# Patient Record
Sex: MALE
[De-identification: names, ages, dates, MRNs, and addresses within clinical notes are randomized per-mention and may not be internally consistent; named-entity substitution may affect disease eponyms.]

---

## 2020-12-11 NOTE — CT
PROCEDURE INFORMATION: 

Exam: CT Abdomen And Pelvis Without Contrast 

Exam date and time: 12/11/2020 2:32 AM 

Age: 50 years old 

Clinical indication: Abdominal pain; Additional info: Left lower quadrant 

abdominal pain 



TECHNIQUE: 

Imaging protocol: Computed tomography of the abdomen and pelvis without 

contrast. 

Radiation optimization: All CT scans at this facility use at least one of these 

dose optimization techniques: automated exposure control; mA and/or kV 

adjustment per patient size (includes targeted exams where dose is matched to 

clinical indication); or iterative reconstruction. 



COMPARISON: 

No relevant prior studies available. 



FINDINGS: 

Liver: Normal. No mass. 

Gallbladder and bile ducts: Normal. No calcified stones. No ductal dilation. 

Pancreas: Normal. No ductal dilation. 

Spleen: Normal. No splenomegaly. 

Adrenal glands: Normal. No mass. 

Kidneys and ureters: A 5.3 mm nonobstructing calculus seen in the lower pole of 

the right kidney. There are 2 nonobstructing calculi seen within the left 

kidney, the largest is seen in the upper pole measuring 3.8 mm. There is 

hydronephrosis and hydroureter seen on the left. There are strandy opacity seen 

adjacent to the proximal left ureter. There is a partially obstructing 3.1 mm 

proximal left ureteral calculus present. 

Stomach and bowel: Unremarkable. No obstruction. No mucosal thickening. 

Appendix: No evidence of appendicitis. 



Intraperitoneal space: Unremarkable. No free air. No significant fluid 

collection. 

Vasculature: Unremarkable. No abdominal aortic aneurysm. 

Lymph nodes: Unremarkable. No enlarged lymph nodes. 

Urinary bladder: Unremarkable as visualized. 

Reproductive: There are small bilateral hydroceles. 

Bones/joints: Unremarkable. No acute fracture. 

Soft tissues: Unremarkable. 



IMPRESSION: 

1. Partially obstructing 3.1 mm proximal left ureteral calculus 

2. There are bilateral nonobstructing renal calculi present, largest is seen in 

the upper pole of the left kidney measuring 3.8 mm. 

3. Small bilateral hydroceles

## 2020-12-11 NOTE — EDM.PDOC
ED HPI GENERAL MEDICAL PROBLEM





- General


Chief Complaint: Diabetic Complaint


Stated Complaint: diabetic


Time Seen by Provider: 12/11/20 01:05


Source of Information: Reports: Patient


History Limitations: Reports: No Limitations





- History of Present Illness


INITIAL COMMENTS - FREE TEXT/NARRATIVE: 





This 49 yo male patient reports to the ED with left lower quadrant abdominal 

pain. The patient reports his symptoms started at about 2300 last night. The 

patient reports his pain woke him up causing him to come to the ED. 


Onset Date: 12/10/20


Onset Time: 23:00


Duration: Constant


Location: Reports: Abdomen (LLQ)


Quality: Reports: Ache, Sharp


Severity: Moderate


Improves with: Reports: Movement


Worsens with: Reports: None


Context: Reports: Other


Associated Symptoms: Reports: No Other Symptoms


  ** Left Lower Abdomen


Pain Score (Numeric/FACES): 8





- Related Data


                                    Allergies











Allergy/AdvReac Type Severity Reaction Status Date / Time


 


No Known Allergies Allergy   Verified 07/31/19 12:28














Past Medical History


Endocrine/Metabolic History: Reports: Diabetes, Type II





Social & Family History





- Tobacco Use


Tobacco Use Status *Q: Never Tobacco User


Second Hand Smoke Exposure: No





- Recreational Drug Use


Recreational Drug Use: No





ED ROS GENERAL





- Review of Systems


Review Of Systems: Comprehensive ROS is negative, except as noted in HPI.





ED EXAM GENERAL NO PERIP PULSE





- Physical Exam


Exam: See Below


Exam Limited By: No Limitations


General Appearance: Alert, WD/WN, Moderate Distress


Eye Exam: Bilateral Eye: EOMI, Normal Inspection, PERRL


Ears: Normal External Exam, Normal Canal, Hearing Grossly Normal, Normal TMs


Nose: Normal Inspection, Normal Mucosa, No Blood


Throat/Mouth: Normal Inspection, Normal Lips, Normal Teeth, Normal Gums, Normal 

Oropharynx, Normal Voice, No Airway Compromise


Head: Atraumatic, Normocephalic


Neck: Normal Inspection, Supple, Non-Tender, Full Range of Motion


Respiratory/Chest: No Respiratory Distress, Lungs Clear, Normal Breath Sounds, 

No Accessory Muscle Use, Chest Non-Tender


Cardiovascular: Normal Peripheral Pulses, Regular Rate, Rhythm, No Edema, No 

Gallop, No JVD, No Murmur, No Rub


GI/Abdominal: Tender (LLQ)


 (Male) Exam: Deferred


Rectal (Males) Exam: Deferred


Back Exam: Normal Inspection, Full Range of Motion, NT


Extremities: Normal Inspection, Normal Range of Motion, Non-Tender, Normal 

Capillary Refill, No Pedal Edema


Neurological: Alert, Oriented, CN II-XII Intact, Normal Cognition, Normal Gait, 

Normal Reflexes, No Motor/Sensory Deficits


Psychiatric: Normal Affect, Normal Mood


Skin Exam: Warm, Dry, Intact, Normal Color, No Rash


Lymphatic: No Adenopathy





Course





- Vital Signs


Last Recorded V/S: 





                                Last Vital Signs











Temp  36.1 C   12/11/20 00:42


 


Pulse  100   12/11/20 00:42


 


Resp  16   12/11/20 00:42


 


BP  132/72   12/11/20 00:42


 


Pulse Ox  97   12/11/20 00:42














- Orders/Labs/Meds


Orders: 





                               Active Orders 24 hr











 Category Date Time Status


 


 CULTURE BLOOD [BC] Stat Lab  12/11/20 00:42 Ordered


 


 Sodium Chloride 0.9% [Normal Saline] 1,000 ml Med  12/11/20 02:58 Ordered





 IV .BOLUS   








                                Medication Orders





Sodium Chloride (Normal Saline)  1,000 mls @ 999 mls/hr IV .BOLUS ONE


   Stop: 12/11/20 03:58


   Last Admin: 12/11/20 03:10  Dose: 999 mls/hr


   Documented by: ANAIS








Labs: 





                                Laboratory Tests











  12/11/20 12/11/20 12/11/20 Range/Units





  00:50 00:50 00:50 


 


WBC   6.8   (5.0-10.0)  10^3/uL


 


RBC   4.83   (4.6-6.2)  10^6/uL


 


Hgb   15.3   (14.0-18.0)  g/dL


 


Hct   44.1   (40.0-54.0)  %


 


MCV   91.3   ()  fL


 


MCH   31.7   (27.0-34.0)  pg


 


MCHC   34.7   (33.0-35.0)  g/dL


 


Plt Count   179   (150-450)  10^3/uL


 


Neut % (Auto)   63.1   (42.2-75.2)  %


 


Lymph % (Auto)   22.1   (20.5-50.1)  %


 


Mono % (Auto)   8.9 H   (2-8)  %


 


Eos % (Auto)   5.6 H   (1.0-3.0)  %


 


Baso % (Auto)   0.3   (0.0-1.0)  %


 


Sodium  140    (136-145)  mmol/L


 


Potassium  3.3 L    (3.5-5.1)  mmol/L


 


Chloride  103    ()  mmol/L


 


Carbon Dioxide  27    (21-32)  mmol/L


 


Anion Gap  13.3 H    (7-13)  mEq/L


 


BUN  35 H    (7-18)  mg/dL


 


Creatinine  1.39 H    (0.70-1.30)  mg/dL


 


Est Cr Clr Drug Dosing  62.52    mL/min


 


Estimated GFR (MDRD)  54    


 


BUN/Creatinine Ratio  25.2    (No establ ref range)  


 


Glucose  87    (74-99)  mg/dL


 


Lactic Acid     (0.4-2.0)  mmol/L


 


Calcium  8.5    (8.5-10.1)  mg/dL


 


Magnesium  2.0    (1.8-2.4)  mg/dL


 


Total Bilirubin  0.5    (0.2-1.0)  mg/dL


 


AST  19    (15-37)  U/L


 


ALT  31    (16-63)  U/L


 


Alkaline Phosphatase  66    ()  U/L


 


Ammonia    < 10 L  (11-32)  umol/L


 


Total Protein  6.2 L    (6.4-8.2)  g/dL


 


Albumin  3.9    (3.4-5.0)  g/dL


 


Globulin  2.3    


 


Albumin/Globulin Ratio  1.7    


 


Amylase  59    ()  U/L


 


Lipase  102    ()  U/L


 


Urine Color     (YELLOW)  


 


Urine Appearance     (CLEAR)  


 


Urine pH     (5.0-9.0)  


 


Ur Specific Gravity     (1.005-1.030)  


 


Urine Protein     (NEGATIVE)  


 


Urine Glucose (UA)     (NEGATIVE)  


 


Urine Ketones     (NEGATIVE)  


 


Urine Occult Blood     (NEGATIVE)  


 


Urine Nitrite     (NEGATIVE)  


 


Urine Bilirubin     (NEGATIVE)  


 


Urine Urobilinogen     (0.2-1.0)  mg/dL


 


Ur Leukocyte Esterase     (NEGATIVE)  


 


Urine Opiates Screen     (NEGATIVE)  


 


Ur Oxycodone Screen     (NEGATIVE)  


 


Urine Methadone Screen     (NEGATIVE)  


 


Ur Barbiturates Screen     (NEGATIVE)  


 


U Tricyclic Antidepress     (NEGATIVE)  


 


Ur Phencyclidine Scrn     (NEGATIVE)  


 


Ur Amphetamine Screen     (NEGATIVE)  


 


U Methamphetamines Scrn     (NEGATIVE)  


 


Urine MDMA Screen     (NEGATIVE)  


 


U Benzodiazepines Scrn     (NEGATIVE)  


 


Urine Cocaine Screen     (NEGATIVE)  


 


U Marijuana (THC) Screen     (NEGATIVE)  


 


SARS-CoV-2 RNA (JAIME)     (NEGATIVE)  














  12/11/20 12/11/20 12/11/20 Range/Units





  00:50 00:54 01:39 


 


WBC     (5.0-10.0)  10^3/uL


 


RBC     (4.6-6.2)  10^6/uL


 


Hgb     (14.0-18.0)  g/dL


 


Hct     (40.0-54.0)  %


 


MCV     ()  fL


 


MCH     (27.0-34.0)  pg


 


MCHC     (33.0-35.0)  g/dL


 


Plt Count     (150-450)  10^3/uL


 


Neut % (Auto)     (42.2-75.2)  %


 


Lymph % (Auto)     (20.5-50.1)  %


 


Mono % (Auto)     (2-8)  %


 


Eos % (Auto)     (1.0-3.0)  %


 


Baso % (Auto)     (0.0-1.0)  %


 


Sodium     (136-145)  mmol/L


 


Potassium     (3.5-5.1)  mmol/L


 


Chloride     ()  mmol/L


 


Carbon Dioxide     (21-32)  mmol/L


 


Anion Gap     (7-13)  mEq/L


 


BUN     (7-18)  mg/dL


 


Creatinine     (0.70-1.30)  mg/dL


 


Est Cr Clr Drug Dosing     mL/min


 


Estimated GFR (MDRD)     


 


BUN/Creatinine Ratio     (No establ ref range)  


 


Glucose     (74-99)  mg/dL


 


Lactic Acid  1.5    (0.4-2.0)  mmol/L


 


Calcium     (8.5-10.1)  mg/dL


 


Magnesium     (1.8-2.4)  mg/dL


 


Total Bilirubin     (0.2-1.0)  mg/dL


 


AST     (15-37)  U/L


 


ALT     (16-63)  U/L


 


Alkaline Phosphatase     ()  U/L


 


Ammonia     (11-32)  umol/L


 


Total Protein     (6.4-8.2)  g/dL


 


Albumin     (3.4-5.0)  g/dL


 


Globulin     


 


Albumin/Globulin Ratio     


 


Amylase     ()  U/L


 


Lipase     ()  U/L


 


Urine Color    Yellow  (YELLOW)  


 


Urine Appearance    Clear  (CLEAR)  


 


Urine pH    5.5  (5.0-9.0)  


 


Ur Specific Gravity    1.025  (1.005-1.030)  


 


Urine Protein    Negative  (NEGATIVE)  


 


Urine Glucose (UA)    Negative  (NEGATIVE)  


 


Urine Ketones    Negative  (NEGATIVE)  


 


Urine Occult Blood    Negative  (NEGATIVE)  


 


Urine Nitrite    Negative  (NEGATIVE)  


 


Urine Bilirubin    Negative  (NEGATIVE)  


 


Urine Urobilinogen    0.2  (0.2-1.0)  mg/dL


 


Ur Leukocyte Esterase    Negative  (NEGATIVE)  


 


Urine Opiates Screen     (NEGATIVE)  


 


Ur Oxycodone Screen     (NEGATIVE)  


 


Urine Methadone Screen     (NEGATIVE)  


 


Ur Barbiturates Screen     (NEGATIVE)  


 


U Tricyclic Antidepress     (NEGATIVE)  


 


Ur Phencyclidine Scrn     (NEGATIVE)  


 


Ur Amphetamine Screen     (NEGATIVE)  


 


U Methamphetamines Scrn     (NEGATIVE)  


 


Urine MDMA Screen     (NEGATIVE)  


 


U Benzodiazepines Scrn     (NEGATIVE)  


 


Urine Cocaine Screen     (NEGATIVE)  


 


U Marijuana (THC) Screen     (NEGATIVE)  


 


SARS-CoV-2 RNA (JAIME)   Negative   (NEGATIVE)  














  12/11/20 Range/Units





  01:39 


 


WBC   (5.0-10.0)  10^3/uL


 


RBC   (4.6-6.2)  10^6/uL


 


Hgb   (14.0-18.0)  g/dL


 


Hct   (40.0-54.0)  %


 


MCV   ()  fL


 


MCH   (27.0-34.0)  pg


 


MCHC   (33.0-35.0)  g/dL


 


Plt Count   (150-450)  10^3/uL


 


Neut % (Auto)   (42.2-75.2)  %


 


Lymph % (Auto)   (20.5-50.1)  %


 


Mono % (Auto)   (2-8)  %


 


Eos % (Auto)   (1.0-3.0)  %


 


Baso % (Auto)   (0.0-1.0)  %


 


Sodium   (136-145)  mmol/L


 


Potassium   (3.5-5.1)  mmol/L


 


Chloride   ()  mmol/L


 


Carbon Dioxide   (21-32)  mmol/L


 


Anion Gap   (7-13)  mEq/L


 


BUN   (7-18)  mg/dL


 


Creatinine   (0.70-1.30)  mg/dL


 


Est Cr Clr Drug Dosing   mL/min


 


Estimated GFR (MDRD)   


 


BUN/Creatinine Ratio   (No establ ref range)  


 


Glucose   (74-99)  mg/dL


 


Lactic Acid   (0.4-2.0)  mmol/L


 


Calcium   (8.5-10.1)  mg/dL


 


Magnesium   (1.8-2.4)  mg/dL


 


Total Bilirubin   (0.2-1.0)  mg/dL


 


AST   (15-37)  U/L


 


ALT   (16-63)  U/L


 


Alkaline Phosphatase   ()  U/L


 


Ammonia   (11-32)  umol/L


 


Total Protein   (6.4-8.2)  g/dL


 


Albumin   (3.4-5.0)  g/dL


 


Globulin   


 


Albumin/Globulin Ratio   


 


Amylase   ()  U/L


 


Lipase   ()  U/L


 


Urine Color   (YELLOW)  


 


Urine Appearance   (CLEAR)  


 


Urine pH   (5.0-9.0)  


 


Ur Specific Gravity   (1.005-1.030)  


 


Urine Protein   (NEGATIVE)  


 


Urine Glucose (UA)   (NEGATIVE)  


 


Urine Ketones   (NEGATIVE)  


 


Urine Occult Blood   (NEGATIVE)  


 


Urine Nitrite   (NEGATIVE)  


 


Urine Bilirubin   (NEGATIVE)  


 


Urine Urobilinogen   (0.2-1.0)  mg/dL


 


Ur Leukocyte Esterase   (NEGATIVE)  


 


Urine Opiates Screen  Negative  (NEGATIVE)  


 


Ur Oxycodone Screen  Negative  (NEGATIVE)  


 


Urine Methadone Screen  Negative  (NEGATIVE)  


 


Ur Barbiturates Screen  Negative  (NEGATIVE)  


 


U Tricyclic Antidepress  Negative  (NEGATIVE)  


 


Ur Phencyclidine Scrn  Negative  (NEGATIVE)  


 


Ur Amphetamine Screen  Negative  (NEGATIVE)  


 


U Methamphetamines Scrn  Negative  (NEGATIVE)  


 


Urine MDMA Screen  Negative  (NEGATIVE)  


 


U Benzodiazepines Scrn  Negative  (NEGATIVE)  


 


Urine Cocaine Screen  Negative  (NEGATIVE)  


 


U Marijuana (THC) Screen  Negative  (NEGATIVE)  


 


SARS-CoV-2 RNA (JAIME)   (NEGATIVE)  











Meds: 





Medications











Generic Name Dose Route Start Last Admin





  Trade Name Freq  PRN Reason Stop Dose Admin


 


Sodium Chloride  1,000 mls @ 999 mls/hr  12/11/20 02:58  12/11/20 03:10





  Normal Saline  IV  12/11/20 03:58  999 mls/hr





  .BOLUS ONE   Administration














Discontinued Medications














Generic Name Dose Route Start Last Admin





  Trade Name Freq  PRN Reason Stop Dose Admin


 


Ketorolac Tromethamine  30 mg  12/11/20 02:58  12/11/20 03:11





  Toradol  IVPUSH  12/11/20 02:59  30 mg





  ONETIME ONE   Administration


 


Tamsulosin HCl  0.4 mg  12/11/20 02:58  12/11/20 03:13





  Flomax  PO  12/11/20 02:59  0.4 mg





  ONETIME ONE   Administration














Departure





- Departure


Time of Disposition: 04:00


Disposition: Home, Self-Care 01


Condition: Fair


Clinical Impression: 


 Kidney stone on left side








- Discharge Information


*PRESCRIPTION DRUG MONITORING PROGRAM REVIEWED*: Not Applicable


*COPY OF PRESCRIPTION DRUG MONITORING REPORT IN PATIENT DIONI: Not Applicable


Instructions:  Kidney Stones, Easy-to-Read


Care Plan Goals: 


The patient was advised of the examination, lab and CT results during the visit.

The patient was given IV fluids, IV Toradol and an oral dose of Flomax while in 

the ED. The patient was discharged with a script for Toradol (10 mg) #20 to take

1 by mouth every 6 hours with food and Flomax (0.4 mg) #7 to take 1 by mouth 

daily for 7 days. The patient should follow-up with his primary care facility in

about 1 week. If the patient has any additional symptoms or concerns, the 

patient should either return to the emergency department or visit his primary 

care facility. 





Sepsis Event Note (ED)





- Evaluation


Sepsis Screening Result: No Definite Risk





- Focused Exam


Vital Signs: 





                                   Vital Signs











  Temp Pulse Resp BP Pulse Ox


 


 12/11/20 00:42  36.1 C  100  16  132/72  97














- My Orders


Last 24 Hours: 





My Active Orders





12/11/20 00:42


CULTURE BLOOD [BC] Stat 





12/11/20 02:58


Sodium Chloride 0.9% [Normal Saline] 1,000 ml IV .BOLUS 














- Assessment/Plan


Last 24 Hours: 





My Active Orders





12/11/20 00:42


CULTURE BLOOD [BC] Stat 





12/11/20 02:58


Sodium Chloride 0.9% [Normal Saline] 1,000 ml IV .BOLUS

## 2022-01-27 ENCOUNTER — HOSPITAL ENCOUNTER (EMERGENCY)
Dept: HOSPITAL 43 - DL.ED | Age: 52
Discharge: NURSING, REHAB OR LONG-TERM CARE | End: 2022-01-27
Payer: COMMERCIAL

## 2022-01-27 DIAGNOSIS — Z20.822: ICD-10-CM

## 2022-01-27 DIAGNOSIS — Z79.899: ICD-10-CM

## 2022-01-27 DIAGNOSIS — T21.24XA: ICD-10-CM
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
## 2022-01-27 DIAGNOSIS — E10.9: ICD-10-CM

## 2022-01-27 DIAGNOSIS — T22.232A: ICD-10-CM

## 2022-01-27 DIAGNOSIS — T21.34XA: Primary | ICD-10-CM
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
## 2022-01-27 LAB
ANION GAP SERPL CALC-SCNC: 16.5 MEQ/L (ref 7–13)
CHLORIDE SERPL-SCNC: 104 MMOL/L (ref 98–107)
SODIUM SERPL-SCNC: 142 MMOL/L (ref 136–145)

## 2022-01-27 PROCEDURE — U0002 COVID-19 LAB TEST NON-CDC: HCPCS
